# Patient Record
Sex: MALE | Race: ASIAN | NOT HISPANIC OR LATINO | URBAN - METROPOLITAN AREA
[De-identification: names, ages, dates, MRNs, and addresses within clinical notes are randomized per-mention and may not be internally consistent; named-entity substitution may affect disease eponyms.]

---

## 2019-10-04 ENCOUNTER — EMERGENCY (EMERGENCY)
Facility: HOSPITAL | Age: 39
LOS: 1 days | Discharge: HOME | End: 2019-10-04
Admitting: EMERGENCY MEDICINE
Payer: COMMERCIAL

## 2019-10-04 VITALS
SYSTOLIC BLOOD PRESSURE: 132 MMHG | RESPIRATION RATE: 18 BRPM | OXYGEN SATURATION: 100 % | DIASTOLIC BLOOD PRESSURE: 76 MMHG | TEMPERATURE: 98 F | HEART RATE: 89 BPM

## 2019-10-04 PROCEDURE — 99283 EMERGENCY DEPT VISIT LOW MDM: CPT

## 2019-10-04 NOTE — ED PROVIDER NOTE - NSFOLLOWUPINSTRUCTIONS_ED_ALL_ED_FT
Follow up with your Primary Medical Doctor in 1-2 days.  Follow up with Urology in 1-2 days see attached list.  Call in 48 hours to follow up results [phone].  Rest.  Drink plenty of fluids.  Return to the ER for any persistent/worsening or new symptoms fevers, chills, pain, discharge, abdominal pain or any concerning symptoms.

## 2019-10-04 NOTE — ED ADULT NURSE NOTE - OBJECTIVE STATEMENT
A&Ox3 presents for STD testing, patient denies any symptoms, denies rash, does not report unprotected sex. Labs, urine sent. No other complaints.

## 2019-10-04 NOTE — ED PROVIDER NOTE - PHYSICAL EXAMINATION
Chaperone for  exam: (ED Tech Jose mendez) 4 small circular flat pink lesion noted to penile head, no vesicles, no pustules, no break in skin, no discharge.

## 2019-10-04 NOTE — ED PROVIDER NOTE - PATIENT PORTAL LINK FT
You can access the FollowMyHealth Patient Portal offered by [facility] by registering at the following website: [email or web address]. By joining [employer]’s FollowMyHealth portal, you will also be able to view your health information using other applications (apps) compatible with our system.

## 2019-10-04 NOTE — ED PROVIDER NOTE - CLINICAL SUMMARY MEDICAL DECISION MAKING FREE TEXT BOX
38 y/o male with no significant PMHx presents to the ER c/o 2-3 weeks of penile rash, pt is well appearing, NAD, 4 small flat pink lesions noted, no vesicles, no pustules, will send GC, Syphilis and HIV testing, follow up .

## 2019-10-04 NOTE — ED PROVIDER NOTE - OBJECTIVE STATEMENT
38 y/o male with no significant PMHx presents to the ER c/o 2-3 weeks of penile rash.  Pt denies fevers, chills, pain, itching, vesicles, pustules, discharge, testicular pain, dysuria, hematuria, frequency.  Pt states last sexual encounter was 4 months ago with protection.  No hx of STDs.  Pt requesting STD testing.

## 2019-10-04 NOTE — ED PROVIDER NOTE - PROGRESS NOTE DETAILS
CHAYITO Chappell: message left on follow up line to follow up Syphilis screening results. CHAYITO FORBES: Patient signed out to me to f/u HIV results. HIV nonreactive. Pt is medically stable for discharge and follow up with PMD. The patient was given verbal and written discharge instructions. Specifically, instructions when to return to the ED and when to seek follow-up from their pcp was discussed. Any specialty follow-up was discussed, including how to make an appointment.  Instructions were discussed in simple, plain language and was understood by the patient. The patient understands that should their symptoms worsen or any new symptoms arise, they should return to the ED immediately for further evaluation. All pt's questions were answered. Patient verbalizes understanding.

## 2019-10-04 NOTE — ED ADULT TRIAGE NOTE - CHIEF COMPLAINT QUOTE
pt. c/o penile rash x 1 week, denies urinary symptoms/fever/chills/discharge, last unprotected sex encounter 2+ years ago. Pt. requesting STD testing. Denies PMHx.

## 2019-10-05 VITALS
TEMPERATURE: 98 F | SYSTOLIC BLOOD PRESSURE: 130 MMHG | HEART RATE: 84 BPM | RESPIRATION RATE: 16 BRPM | OXYGEN SATURATION: 100 % | DIASTOLIC BLOOD PRESSURE: 74 MMHG

## 2019-10-05 LAB
HIV COMBO RESULT: SIGNIFICANT CHANGE UP
HIV1+2 AB SPEC QL: SIGNIFICANT CHANGE UP
T PALLIDUM AB TITR SER: NEGATIVE — SIGNIFICANT CHANGE UP

## 2019-10-07 LAB
C TRACH RRNA SPEC QL NAA+PROBE: SIGNIFICANT CHANGE UP
N GONORRHOEA RRNA SPEC QL NAA+PROBE: SIGNIFICANT CHANGE UP
SPECIMEN SOURCE: SIGNIFICANT CHANGE UP

## 2019-10-08 PROBLEM — Z78.9 OTHER SPECIFIED HEALTH STATUS: Chronic | Status: ACTIVE | Noted: 2019-10-04

## 2019-10-09 ENCOUNTER — APPOINTMENT (OUTPATIENT)
Dept: UROLOGY | Facility: CLINIC | Age: 39
End: 2019-10-09
Payer: COMMERCIAL

## 2019-10-09 VITALS
SYSTOLIC BLOOD PRESSURE: 140 MMHG | HEART RATE: 87 BPM | WEIGHT: 167 LBS | HEIGHT: 70 IN | BODY MASS INDEX: 23.91 KG/M2 | DIASTOLIC BLOOD PRESSURE: 78 MMHG | OXYGEN SATURATION: 98 %

## 2019-10-09 DIAGNOSIS — N48.89 OTHER SPECIFIED DISORDERS OF PENIS: ICD-10-CM

## 2019-10-09 PROBLEM — Z00.00 ENCOUNTER FOR PREVENTIVE HEALTH EXAMINATION: Status: ACTIVE | Noted: 2019-10-09

## 2019-10-09 PROCEDURE — 99203 OFFICE O/P NEW LOW 30 MIN: CPT

## 2019-10-09 NOTE — ASSESSMENT
[FreeTextEntry1] : penile lesion c/w pearly papules \par patient reassured\par told to continue to have 'safe sex' and use condoms\par

## 2019-10-09 NOTE — HISTORY OF PRESENT ILLNESS
[FreeTextEntry1] : patient sex active 4 m ago with condom but noticed some flat pink lesion on corona and went to ER concerned about STD\par had eval done with GC  and Chlam : negative and no luts\par no dysuria or henmaturia\par adtmits to masturbation when he noticed lesions as red- better now \par no meds given in ER but told to F/u with

## 2019-10-09 NOTE — PHYSICAL EXAM
[General Appearance - Well Developed] : well developed [General Appearance - Well Nourished] : well nourished [Normal Appearance] : normal appearance [Well Groomed] : well groomed [Urethral Meatus] : meatus normal [General Appearance - In No Acute Distress] : no acute distress [Penis Abnormality] : normal circumcised penis [FreeTextEntry1] : no pap lesions seen, area of concern c/w  benign 'pearly papules'

## 2022-07-21 ENCOUNTER — NON-APPOINTMENT (OUTPATIENT)
Age: 42
End: 2022-07-21

## 2022-08-10 RX ORDER — AZITHROMYCIN 250 MG/1
250 TABLET, FILM COATED ORAL
Qty: 1 | Refills: 0 | Status: ACTIVE | COMMUNITY
Start: 2022-08-10 | End: 1900-01-01

## 2023-02-28 RX ORDER — AMOXICILLIN AND CLAVULANATE POTASSIUM 875; 125 MG/1; MG/1
875-125 TABLET, COATED ORAL TWICE DAILY
Qty: 20 | Refills: 0 | Status: ACTIVE | COMMUNITY
Start: 2023-02-28 | End: 1900-01-01

## 2023-09-09 ENCOUNTER — EMERGENCY (EMERGENCY)
Facility: HOSPITAL | Age: 43
LOS: 1 days | Discharge: HOME | End: 2023-09-09
Attending: EMERGENCY MEDICINE | Admitting: EMERGENCY MEDICINE
Payer: COMMERCIAL

## 2023-09-09 VITALS
DIASTOLIC BLOOD PRESSURE: 84 MMHG | WEIGHT: 171.08 LBS | HEIGHT: 70 IN | OXYGEN SATURATION: 100 % | SYSTOLIC BLOOD PRESSURE: 140 MMHG | HEART RATE: 102 BPM | RESPIRATION RATE: 18 BRPM | TEMPERATURE: 99 F

## 2023-09-09 LAB
ALBUMIN SERPL ELPH-MCNC: 3.9 G/DL — SIGNIFICANT CHANGE UP (ref 3.3–5)
ALP SERPL-CCNC: 76 U/L — SIGNIFICANT CHANGE UP (ref 40–120)
ALT FLD-CCNC: 27 U/L — SIGNIFICANT CHANGE UP (ref 12–78)
ANION GAP SERPL CALC-SCNC: 6 MMOL/L — SIGNIFICANT CHANGE UP (ref 5–17)
APTT BLD: 29.2 SEC — SIGNIFICANT CHANGE UP (ref 24.5–35.6)
AST SERPL-CCNC: 23 U/L — SIGNIFICANT CHANGE UP (ref 15–37)
BASOPHILS # BLD AUTO: 0.05 K/UL — SIGNIFICANT CHANGE UP (ref 0–0.2)
BASOPHILS NFR BLD AUTO: 0.7 % — SIGNIFICANT CHANGE UP (ref 0–2)
BILIRUB SERPL-MCNC: 0.5 MG/DL — SIGNIFICANT CHANGE UP (ref 0.2–1.2)
BUN SERPL-MCNC: 11 MG/DL — SIGNIFICANT CHANGE UP (ref 7–23)
CALCIUM SERPL-MCNC: 9.1 MG/DL — SIGNIFICANT CHANGE UP (ref 8.5–10.1)
CHLORIDE SERPL-SCNC: 108 MMOL/L — SIGNIFICANT CHANGE UP (ref 96–108)
CO2 SERPL-SCNC: 27 MMOL/L — SIGNIFICANT CHANGE UP (ref 22–31)
CREAT SERPL-MCNC: 1.2 MG/DL — SIGNIFICANT CHANGE UP (ref 0.5–1.3)
D DIMER BLD IA.RAPID-MCNC: <150 NG/ML DDU — SIGNIFICANT CHANGE UP
EGFR: 78 ML/MIN/1.73M2 — SIGNIFICANT CHANGE UP
EOSINOPHIL # BLD AUTO: 0.16 K/UL — SIGNIFICANT CHANGE UP (ref 0–0.5)
EOSINOPHIL NFR BLD AUTO: 2.1 % — SIGNIFICANT CHANGE UP (ref 0–6)
GLUCOSE SERPL-MCNC: 103 MG/DL — HIGH (ref 70–99)
HCT VFR BLD CALC: 45.4 % — SIGNIFICANT CHANGE UP (ref 39–50)
HGB BLD-MCNC: 15.4 G/DL — SIGNIFICANT CHANGE UP (ref 13–17)
IMM GRANULOCYTES NFR BLD AUTO: 0.4 % — SIGNIFICANT CHANGE UP (ref 0–0.9)
INR BLD: 0.92 RATIO — SIGNIFICANT CHANGE UP (ref 0.85–1.18)
LIDOCAIN IGE QN: 50 U/L — SIGNIFICANT CHANGE UP (ref 13–75)
LYMPHOCYTES # BLD AUTO: 3.59 K/UL — HIGH (ref 1–3.3)
LYMPHOCYTES # BLD AUTO: 47.9 % — HIGH (ref 13–44)
MAGNESIUM SERPL-MCNC: 2.1 MG/DL — SIGNIFICANT CHANGE UP (ref 1.6–2.6)
MCHC RBC-ENTMCNC: 30.5 PG — SIGNIFICANT CHANGE UP (ref 27–34)
MCHC RBC-ENTMCNC: 33.9 GM/DL — SIGNIFICANT CHANGE UP (ref 32–36)
MCV RBC AUTO: 89.9 FL — SIGNIFICANT CHANGE UP (ref 80–100)
MONOCYTES # BLD AUTO: 0.48 K/UL — SIGNIFICANT CHANGE UP (ref 0–0.9)
MONOCYTES NFR BLD AUTO: 6.4 % — SIGNIFICANT CHANGE UP (ref 2–14)
NEUTROPHILS # BLD AUTO: 3.18 K/UL — SIGNIFICANT CHANGE UP (ref 1.8–7.4)
NEUTROPHILS NFR BLD AUTO: 42.5 % — LOW (ref 43–77)
NRBC # BLD: 0 /100 WBCS — SIGNIFICANT CHANGE UP (ref 0–0)
PLATELET # BLD AUTO: 312 K/UL — SIGNIFICANT CHANGE UP (ref 150–400)
POTASSIUM SERPL-MCNC: 3.9 MMOL/L — SIGNIFICANT CHANGE UP (ref 3.5–5.3)
POTASSIUM SERPL-SCNC: 3.9 MMOL/L — SIGNIFICANT CHANGE UP (ref 3.5–5.3)
PROT SERPL-MCNC: 8.2 G/DL — SIGNIFICANT CHANGE UP (ref 6–8.3)
PROTHROM AB SERPL-ACNC: 10.8 SEC — SIGNIFICANT CHANGE UP (ref 9.5–13)
RBC # BLD: 5.05 M/UL — SIGNIFICANT CHANGE UP (ref 4.2–5.8)
RBC # FLD: 12.9 % — SIGNIFICANT CHANGE UP (ref 10.3–14.5)
SARS-COV-2 RNA SPEC QL NAA+PROBE: SIGNIFICANT CHANGE UP
SODIUM SERPL-SCNC: 141 MMOL/L — SIGNIFICANT CHANGE UP (ref 135–145)
TROPONIN I, HIGH SENSITIVITY RESULT: <3 NG/L — SIGNIFICANT CHANGE UP
WBC # BLD: 7.49 K/UL — SIGNIFICANT CHANGE UP (ref 3.8–10.5)
WBC # FLD AUTO: 7.49 K/UL — SIGNIFICANT CHANGE UP (ref 3.8–10.5)

## 2023-09-09 PROCEDURE — 87635 SARS-COV-2 COVID-19 AMP PRB: CPT

## 2023-09-09 PROCEDURE — 85025 COMPLETE CBC W/AUTO DIFF WBC: CPT

## 2023-09-09 PROCEDURE — 36415 COLL VENOUS BLD VENIPUNCTURE: CPT

## 2023-09-09 PROCEDURE — 71046 X-RAY EXAM CHEST 2 VIEWS: CPT | Mod: 26

## 2023-09-09 PROCEDURE — 99285 EMERGENCY DEPT VISIT HI MDM: CPT

## 2023-09-09 PROCEDURE — 83690 ASSAY OF LIPASE: CPT

## 2023-09-09 PROCEDURE — 71046 X-RAY EXAM CHEST 2 VIEWS: CPT

## 2023-09-09 PROCEDURE — 85610 PROTHROMBIN TIME: CPT

## 2023-09-09 PROCEDURE — 85730 THROMBOPLASTIN TIME PARTIAL: CPT

## 2023-09-09 PROCEDURE — 83735 ASSAY OF MAGNESIUM: CPT

## 2023-09-09 PROCEDURE — 80053 COMPREHEN METABOLIC PANEL: CPT

## 2023-09-09 PROCEDURE — 99285 EMERGENCY DEPT VISIT HI MDM: CPT | Mod: 25

## 2023-09-09 PROCEDURE — 93005 ELECTROCARDIOGRAM TRACING: CPT

## 2023-09-09 PROCEDURE — 85379 FIBRIN DEGRADATION QUANT: CPT

## 2023-09-09 PROCEDURE — 84484 ASSAY OF TROPONIN QUANT: CPT

## 2023-09-09 PROCEDURE — 93010 ELECTROCARDIOGRAM REPORT: CPT

## 2023-09-09 NOTE — ED PROVIDER NOTE - OBJECTIVE STATEMENT
Patient is a 43-year-old male with no significant medical or surgical history.  He is an avid daily runner and exerciser.  He recently returned from a vacation in [place] where he was enjoying zip lining and climbing and hiking in the hills and voids.  He returned 2 days ago.  Last night he started having chest pain chest pressure that was not so bad so he did not come to the hospital this morning after breakfast he developed the same symptoms of chest pressure over the left breast so he came to the emergency room for evaluation with his father.  No family history of cardiac disease father only has hypertension mother has no medical history significant.  He has no calf pain or calf swelling.  He has no rash fevers or chills.  He has no cough.  He has no sick contacts.  He denies dyspnea on exertion.  He is able to run.  He went running this morning.

## 2023-09-09 NOTE — ED ADULT TRIAGE NOTE - CHIEF COMPLAINT QUOTE
Pt ambulatory to triage c/o chest heaviness beginning on Wesdnesday after he landed from a flight from [facility] for vacation.   Pt states it has been persistent discomfort since then but denies sob/palpitations, states he was able to work an exercise normally.   Pt states this morning however pt woke up with increased L sided chest discomfort radiating to L arm with mild tingling and mild dyspnea.  Pt speaking in clear complete sentences at this time, denies swelling/pain to BLLE.   Skin warm and dry denies cough/fever/ illness.   EKG completed. BN

## 2023-09-09 NOTE — ED ADULT NURSE NOTE - OBJECTIVE STATEMENT
patient comes in with complaints of chest heaviness beginning on Wesdnesday after he landed from a flight from [facility] for vacation.  patient states it has been persistent discomfort since then but denies sob, states he was able to work an exercise normally.   Pt states this morning however pt woke up with increased L sided chest discomfort radiating to L arm with mild tingling.

## 2023-09-09 NOTE — ED ADULT NURSE NOTE - CHIEF COMPLAINT QUOTE
Pt ambulatory to triage c/o chest heaviness beginning on Wesdnesday after he landed from a flight from [facility] for vacation.   Pt states it has been persistent discomfort since then but denies sob/palpitations, states he was able to work an exercise normally.   Pt states this morning however pt woke up with increased L sided chest discomfort radiating to L arm with mild tingling and mild dyspnea.  Pt speaking in clear complete sentences at this time, denies swelling/pain to BLLE.   Skin warm and dry denies cough/fever/ illness.   EKG completed. BN

## 2023-09-09 NOTE — ED PROVIDER NOTE - CLINICAL SUMMARY MEDICAL DECISION MAKING FREE TEXT BOX
43 male at the left Chang runner.  Return from travel to [place] back to [place] now complains of chest pain chest pressure on and off since last night.  Nonradiating no diaphoresis no shortness of breath no dyspnea on exertion no edema no rash no trauma.  No acute communicable disease symptoms.  States pain feels like gas.  Plan of care includes his emergency EKG to rule out STEMI, troponins, laboratory studies including LFTs.  Chest x-ray rule out pneumothorax or pneumonia.  COVID testing, disposition accordingly.  Using the heart score calculator patient is at low risk this chart was made with dictation software and may contain typographical errors.

## 2023-09-09 NOTE — ED PROVIDER NOTE - CARE PROVIDER_API CALL
Sylvia Shea  Cardiology  [address] [place] [id number]  Phone: [phone]  Fax: [phone]  Follow Up Time:

## 2023-09-09 NOTE — ED PROVIDER NOTE - PATIENT PORTAL LINK FT
You can access the FollowMyHealth Patient Portal offered by [facility] by registering at the following website: [email or web address]. By joining [employer]’s FollowMyHealth portal, you will also be able to view your health information using other applications (apps) compatible with our system.

## 2023-09-09 NOTE — ED PROVIDER NOTE - NSFOLLOWUPINSTRUCTIONS_ED_ALL_ED_FT
follow up with cardiology dr Shea  light activity until cleared to return to full activity by your primary care physician or cardiology  Clear liquid diet advance slowly as tolerated to  Eat small volumes   NO fatty fried foods, no milk or mild products, no tomato, spice, mint, tobacco, alcohol, caffeine  Stay well hydrated: a teaspoon at a time until able to tolerate normal intake.  Prompt follow up with your doctor is essential  Use tums or over the counter antacids as needed  return for worsening symptoms or any problems/concerns  Nonspecific Chest Pain  Chest pain can be caused by many different conditions. Some causes of chest pain can be life-threatening. These will require treatment right away. Serious causes of chest pain include:  Heart attack.  A tear in the body's main blood vessel.  Redness and swelling (inflammation) around your heart.  Blood clot in your lungs.  Other causes of chest pain may not be so serious. These include:  Heartburn.  Anxiety or stress.  Damage to bones or muscles in your chest.  Lung infections.  Chest pain can feel like:  Pain or discomfort in your chest.  Crushing, pressure, aching, or squeezing pain.  Burning or tingling.  Dull or sharp pain that is worse when you move, cough, or take a deep breath.  Pain or discomfort that is also felt in your back, neck, jaw, shoulder, or arm, or pain that spreads to any of these areas.  It is hard to know whether your pain is caused by something that is serious or something that is not so serious. So it is important to see your doctor right away if you have chest pain.    Follow these instructions at home:  Medicines    Take over-the-counter and prescription medicines only as told by your doctor.  If you were prescribed an antibiotic medicine, take it as told by your doctor. Do not stop taking the antibiotic even if you start to feel better.  Lifestyle    A plate along with examples of foods in a healthy diet.  Rest as told by your doctor.  Do not use any products that contain nicotine or tobacco, such as cigarettes, e-cigarettes, and chewing tobacco. If you need help quitting, ask your doctor.  Do not drink alcohol.  Make lifestyle changes as told by your doctor. These may include:  Getting regular exercise. Ask your doctor what activities are safe for you.  Eating a heart-healthy diet. A diet and nutrition specialist (dietitian) can help you to learn healthy eating options.  Staying at a healthy weight.  Treating diabetes or high blood pressure, if needed.  Lowering your stress. Activities such as yoga and relaxation techniques can help.  General instructions    Pay attention to any changes in your symptoms. Tell your doctor about them or any new symptoms.  Avoid any activities that cause chest pain.  Keep all follow-up visits as told by your doctor. This is important. You may need more testing if your chest pain does not go away.  Contact a doctor if:  Your chest pain does not go away.  You feel depressed.  You have a fever.  Get help right away if:  Your chest pain is worse.  You have a cough that gets worse, or you cough up blood.  You have very bad (severe) pain in your belly (abdomen).  You pass out (faint).  You have either of these for no clear reason:  Sudden chest discomfort.  Sudden discomfort in your arms, back, neck, or jaw.  You have shortness of breath at any time.  You suddenly start to sweat, or your skin gets clammy.  You feel sick to your stomach (nauseous).  You throw up (vomit).  You suddenly feel lightheaded or dizzy.  You feel very weak or tired.  Your heart starts to beat fast, or it feels like it is skipping beats.  These symptoms may be an emergency. Do not wait to see if the symptoms will go away. Get medical help right away. Call your local emergency services (911 in the [place] Do not drive yourself to the hospital.    Summary  Chest pain can be caused by many different conditions. The cause may be serious and need treatment right away. If you have chest pain, see your doctor right away.  Follow your doctor's instructions for taking medicines and making lifestyle changes.  Keep all follow-up visits as told by your doctor. This includes visits for any further testing if your chest pain does not go away.  Be sure to know the signs that show that your condition has become worse. Get help right away if you have these symptoms.  This information is not intended to replace advice given to you by your health care provider. Make sure you discuss any questions you have with your health care provider.

## 2024-02-01 NOTE — ED ADULT NURSE NOTE - NSFALLRSKASSESASSIST_ED_ALL_ED
no You can access the FollowMyHealth Patient Portal offered by [facility] by registering at the following website: [email or web address]. By joining [employer]’s FollowMyHealth portal, you will also be able to view your health information using other applications (apps) compatible with our system.

## 2024-03-30 ENCOUNTER — NON-APPOINTMENT (OUTPATIENT)
Age: 44
End: 2024-03-30

## 2024-03-31 ENCOUNTER — NON-APPOINTMENT (OUTPATIENT)
Age: 44
End: 2024-03-31

## 2024-04-01 ENCOUNTER — RESULT REVIEW (OUTPATIENT)
Age: 44
End: 2024-04-01

## 2024-04-02 ENCOUNTER — APPOINTMENT (OUTPATIENT)
Dept: ORTHOPEDIC SURGERY | Facility: CLINIC | Age: 44
End: 2024-04-02
Payer: COMMERCIAL

## 2024-04-02 VITALS — HEIGHT: 70 IN | WEIGHT: 170 LBS | BODY MASS INDEX: 24.34 KG/M2

## 2024-04-02 PROCEDURE — 99203 OFFICE O/P NEW LOW 30 MIN: CPT

## 2024-04-02 RX ORDER — SULFAMETHOXAZOLE AND TRIMETHOPRIM 800; 160 MG/1; MG/1
800-160 TABLET ORAL TWICE DAILY
Qty: 14 | Refills: 0 | Status: ACTIVE | COMMUNITY
Start: 2024-04-02 | End: 1900-01-01

## 2024-04-02 NOTE — IMAGING
[de-identified] : LEFT ELBOW ~2cm laceration over olecranon with macerated skin edges - laceration from impact. mild surrounding swelling. sutures in place. no erythema or drainage. superficial abrasions to ulnar distal forearm and dorsal wrist - abrasions from watch. no erythema or drainage. TTP to wound, otherwise non-TTP to olecranon or distal triceps. elbow ROM: mild limited extension, good flexion. good pronation, supination.  elbow extension 5/5. no pain with resisted elbow extension. wrist ROM: good extension, flexion. good pronation, supination. good digital extension, flex to full fist. Sensation intact to light touch. palpable radial pulse.   XRAYS OF LEFT ELBOW @[facility] 4/1/24: non-displaced fracture through small olecranon enthesophyte.

## 2024-04-02 NOTE — ASSESSMENT
[FreeTextEntry1] : The condition was explained to the patient - X-rays show non-displaced fracture through the small olecranon enthesophyte, but there is no tenderness over this area, and there is no pain or weakness with resisted elbow extension. - ok to wash wounds with soap and water. do not scrub/soak the wounds. ok to apply vaseline to distal forearm wound until dry, do not apply ointment to elbow wound. dress with gauze and ACE wrap, change once daily or more frequently if soiled. - encouraged HEP elbow flexion/extension stretches.  - pain guided activity modification. - prescribed Bactrim DS, 1 tab BID x 7 days.  F/u 1 week for suture removal.

## 2024-04-02 NOTE — HISTORY OF PRESENT ILLNESS
[Dull/Aching] : dull/aching [] : yes [de-identified] : 4/2/24: 44yo male (RHD. luz) presents for LEFT elbow pain after he missed a step and struck his elbow against the edge of the step on 3/31/24. Went to [facility] on [address] => elbow laceration sutured. Returned to [facility] on 4/1/24 => XR L elbow, splint.  Hx: none. [FreeTextEntry1] : LEFT elbow  [FreeTextEntry5] : LUCÍA gonzalez [RHD] 43 year old male is here today c/o LEFT elbow pain since 03/31/24 after falling down a flight stairs. [facility] [address] +3 stitches to elbow. returned to [facility] 04/01/24 +xrays (positive) +splint. denies prior injury to arm. denies numbness/tingling in upper extremity. pain minimal today.  [de-identified] : 04/01/24 [de-identified] : [facility]  [de-identified] : xrays

## 2024-04-10 ENCOUNTER — APPOINTMENT (OUTPATIENT)
Dept: ORTHOPEDIC SURGERY | Facility: CLINIC | Age: 44
End: 2024-04-10
Payer: COMMERCIAL

## 2024-04-10 VITALS — HEIGHT: 70 IN | BODY MASS INDEX: 24.34 KG/M2 | WEIGHT: 170 LBS

## 2024-04-10 DIAGNOSIS — S51.012A LACERATION W/OUT FOREIGN BODY OF LEFT ELBOW, INITIAL ENCOUNTER: ICD-10-CM

## 2024-04-10 PROCEDURE — 99213 OFFICE O/P EST LOW 20 MIN: CPT

## 2024-04-10 NOTE — ASSESSMENT
[FreeTextEntry1] : - sutures removed. patient tolerated procedure well. - ok to wash wounds with soap and water. do not scrub/soak the wounds. ok to apply vaseline to distal forearm wound until dry, do not apply ointment to elbow wound. dress with gauze and ACE wrap, change once daily or more frequently if soiled. - continue HEP elbow flexion/extension stretches.  - advance activity as tolerated.  F/u PRN.

## 2024-04-10 NOTE — IMAGING
[de-identified] : LEFT ELBOW healing wound over olecranon, fibrinous slough. mild surrounding swelling. sutures in place. no erythema or drainage. elbow ROM: good extension, good flexion. good pronation, supination.  elbow extension 5/5. no pain with resisted elbow extension. wrist ROM: good extension, flexion. good pronation, supination. good digital extension, flex to full fist. Sensation intact to light touch. palpable radial pulse.

## 2024-04-10 NOTE — HISTORY OF PRESENT ILLNESS
[Dull/Aching] : dull/aching [] : yes [de-identified] : 4/10/24: f/u LEFT elbow wound. pain and ROM better. Denies fever, chills, wound redness or drainage, or systemic ill symptoms.   4/2/24: 44yo male (RHD. luz) presents for LEFT elbow pain after he missed a step and struck his elbow against the edge of the step on 3/31/24. Went to [facility] on [address] => elbow laceration sutured. Returned to [facility] on 4/1/24 => XR L elbow, splint.  Hx: none. [FreeTextEntry1] : LEFT elbow  [FreeTextEntry5] : Follow Up; LEFT elbow. Doing well; feels pain when pressure is applied.  [de-identified] : 04/01/24 [de-identified] : [facility]  [de-identified] : xrays

## 2024-08-20 ENCOUNTER — NON-APPOINTMENT (OUTPATIENT)
Age: 44
End: 2024-08-20

## 2025-01-08 ENCOUNTER — NON-APPOINTMENT (OUTPATIENT)
Age: 45
End: 2025-01-08